# Patient Record
Sex: MALE | Race: WHITE | URBAN - METROPOLITAN AREA
[De-identification: names, ages, dates, MRNs, and addresses within clinical notes are randomized per-mention and may not be internally consistent; named-entity substitution may affect disease eponyms.]

---

## 2019-04-06 ENCOUNTER — APPOINTMENT (OUTPATIENT)
Dept: CT IMAGING | Facility: CLINIC | Age: 44
End: 2019-04-06
Attending: EMERGENCY MEDICINE
Payer: COMMERCIAL

## 2019-04-06 ENCOUNTER — HOSPITAL ENCOUNTER (EMERGENCY)
Facility: CLINIC | Age: 44
Discharge: HOME | End: 2019-04-06
Attending: EMERGENCY MEDICINE | Admitting: EMERGENCY MEDICINE
Payer: COMMERCIAL

## 2019-04-06 VITALS
TEMPERATURE: 97.4 F | DIASTOLIC BLOOD PRESSURE: 97 MMHG | HEIGHT: 73 IN | RESPIRATION RATE: 18 BRPM | HEART RATE: 78 BPM | OXYGEN SATURATION: 97 % | SYSTOLIC BLOOD PRESSURE: 142 MMHG

## 2019-04-06 DIAGNOSIS — N20.1 URETEROLITHIASIS: ICD-10-CM

## 2019-04-06 DIAGNOSIS — R10.9 FLANK PAIN: ICD-10-CM

## 2019-04-06 LAB
ALBUMIN SERPL-MCNC: 4.2 G/DL (ref 3.4–5)
ALBUMIN UR-MCNC: 10 MG/DL
ALP SERPL-CCNC: 84 U/L (ref 40–150)
ALT SERPL W P-5'-P-CCNC: 32 U/L (ref 0–70)
AMORPH CRY #/AREA URNS HPF: ABNORMAL /HPF
ANION GAP SERPL CALCULATED.3IONS-SCNC: 10 MMOL/L (ref 3–14)
APPEARANCE UR: ABNORMAL
AST SERPL W P-5'-P-CCNC: 16 U/L (ref 0–45)
BASOPHILS # BLD AUTO: 0 10E9/L (ref 0–0.2)
BASOPHILS NFR BLD AUTO: 0.1 %
BILIRUB SERPL-MCNC: 0.4 MG/DL (ref 0.2–1.3)
BILIRUB UR QL STRIP: NEGATIVE
BUN SERPL-MCNC: 21 MG/DL (ref 7–30)
CALCIUM SERPL-MCNC: 9.5 MG/DL (ref 8.5–10.1)
CHLORIDE SERPL-SCNC: 107 MMOL/L (ref 94–109)
CO2 SERPL-SCNC: 24 MMOL/L (ref 20–32)
COLOR UR AUTO: YELLOW
CREAT SERPL-MCNC: 1.08 MG/DL (ref 0.66–1.25)
DIFFERENTIAL METHOD BLD: ABNORMAL
EOSINOPHIL # BLD AUTO: 0 10E9/L (ref 0–0.7)
EOSINOPHIL NFR BLD AUTO: 0.3 %
ERYTHROCYTE [DISTWIDTH] IN BLOOD BY AUTOMATED COUNT: 12.4 % (ref 10–15)
GFR SERPL CREATININE-BSD FRML MDRD: 84 ML/MIN/{1.73_M2}
GLUCOSE SERPL-MCNC: 132 MG/DL (ref 70–99)
GLUCOSE UR STRIP-MCNC: NEGATIVE MG/DL
HCT VFR BLD AUTO: 45.8 % (ref 40–53)
HGB BLD-MCNC: 16.1 G/DL (ref 13.3–17.7)
HGB UR QL STRIP: ABNORMAL
IMM GRANULOCYTES # BLD: 0 10E9/L (ref 0–0.4)
IMM GRANULOCYTES NFR BLD: 0.2 %
KETONES UR STRIP-MCNC: 10 MG/DL
LEUKOCYTE ESTERASE UR QL STRIP: NEGATIVE
LIPASE SERPL-CCNC: 103 U/L (ref 73–393)
LYMPHOCYTES # BLD AUTO: 1.3 10E9/L (ref 0.8–5.3)
LYMPHOCYTES NFR BLD AUTO: 9.1 %
MCH RBC QN AUTO: 31.4 PG (ref 26.5–33)
MCHC RBC AUTO-ENTMCNC: 35.2 G/DL (ref 31.5–36.5)
MCV RBC AUTO: 89 FL (ref 78–100)
MONOCYTES # BLD AUTO: 0.7 10E9/L (ref 0–1.3)
MONOCYTES NFR BLD AUTO: 5 %
MUCOUS THREADS #/AREA URNS LPF: PRESENT /LPF
NEUTROPHILS # BLD AUTO: 11.7 10E9/L (ref 1.6–8.3)
NEUTROPHILS NFR BLD AUTO: 85.3 %
NITRATE UR QL: NEGATIVE
NRBC # BLD AUTO: 0 10*3/UL
NRBC BLD AUTO-RTO: 0 /100
PH UR STRIP: 7 PH (ref 5–7)
PLATELET # BLD AUTO: 268 10E9/L (ref 150–450)
POTASSIUM SERPL-SCNC: 3.6 MMOL/L (ref 3.4–5.3)
PROT SERPL-MCNC: 7.5 G/DL (ref 6.8–8.8)
RBC # BLD AUTO: 5.13 10E12/L (ref 4.4–5.9)
RBC #/AREA URNS AUTO: >182 /HPF (ref 0–2)
SODIUM SERPL-SCNC: 141 MMOL/L (ref 133–144)
SOURCE: ABNORMAL
SP GR UR STRIP: 1.02 (ref 1–1.03)
UROBILINOGEN UR STRIP-MCNC: NORMAL MG/DL (ref 0–2)
WBC # BLD AUTO: 13.8 10E9/L (ref 4–11)
WBC #/AREA URNS AUTO: 4 /HPF (ref 0–5)

## 2019-04-06 PROCEDURE — 85025 COMPLETE CBC W/AUTO DIFF WBC: CPT | Performed by: EMERGENCY MEDICINE

## 2019-04-06 PROCEDURE — 80053 COMPREHEN METABOLIC PANEL: CPT | Performed by: EMERGENCY MEDICINE

## 2019-04-06 PROCEDURE — 25000125 ZZHC RX 250: Performed by: EMERGENCY MEDICINE

## 2019-04-06 PROCEDURE — 99285 EMERGENCY DEPT VISIT HI MDM: CPT | Mod: 25

## 2019-04-06 PROCEDURE — 25000128 H RX IP 250 OP 636: Performed by: EMERGENCY MEDICINE

## 2019-04-06 PROCEDURE — 74177 CT ABD & PELVIS W/CONTRAST: CPT

## 2019-04-06 PROCEDURE — 83690 ASSAY OF LIPASE: CPT | Performed by: EMERGENCY MEDICINE

## 2019-04-06 PROCEDURE — 81001 URINALYSIS AUTO W/SCOPE: CPT | Performed by: EMERGENCY MEDICINE

## 2019-04-06 RX ORDER — IOPAMIDOL 755 MG/ML
101 INJECTION, SOLUTION INTRAVASCULAR ONCE
Status: COMPLETED | OUTPATIENT
Start: 2019-04-06 | End: 2019-04-06

## 2019-04-06 RX ORDER — ONDANSETRON 4 MG/1
4 TABLET, ORALLY DISINTEGRATING ORAL EVERY 6 HOURS PRN
Qty: 10 TABLET | Refills: 0 | Status: SHIPPED | OUTPATIENT
Start: 2019-04-06 | End: 2019-04-13

## 2019-04-06 RX ORDER — HYDROCODONE BITARTRATE AND ACETAMINOPHEN 5; 325 MG/1; MG/1
1 TABLET ORAL EVERY 6 HOURS PRN
Qty: 10 TABLET | Refills: 0 | Status: SHIPPED | OUTPATIENT
Start: 2019-04-06

## 2019-04-06 RX ORDER — TAMSULOSIN HYDROCHLORIDE 0.4 MG/1
0.4 CAPSULE ORAL DAILY
Qty: 7 CAPSULE | Refills: 0 | Status: SHIPPED | OUTPATIENT
Start: 2019-04-06 | End: 2019-04-13

## 2019-04-06 RX ADMIN — SODIUM CHLORIDE 71 ML: 9 INJECTION, SOLUTION INTRAVENOUS at 14:08

## 2019-04-06 RX ADMIN — IOPAMIDOL 101 ML: 755 INJECTION, SOLUTION INTRAVENOUS at 14:07

## 2019-04-06 ASSESSMENT — ENCOUNTER SYMPTOMS
FEVER: 0
ABDOMINAL PAIN: 1
CONSTIPATION: 0
NAUSEA: 0
DIARRHEA: 0
DYSURIA: 0
DIFFICULTY URINATING: 0
CHILLS: 0
FLANK PAIN: 0
VOMITING: 0
BLOOD IN STOOL: 0
HEMATURIA: 0

## 2019-04-06 NOTE — ED PROVIDER NOTES
"  History     Chief Complaint:  Abdominal pain     HPI   Miguel Montero is a 44 year old male who presents via EMS with abdominal pain.  The patient reports having a normal bowel movement this morning followed by sudden onset of lower abdominal pain which started more on the right but then spread to the left side as well.  This gradually worsened over approximately 30 minutes to the point where it was unbearable, prompting him to call 911.  He has never experienced similar pain in the past.  He has been eating normally and denies any nausea or vomiting.  He did not see any blood in his stool and denies recent constipation.  His pain has subsided somewhat since onset but is still present.  He denies any flank pain, urinary frequency, dysuria, hesitancy, fevers, or chills.      Allergies:  No known drug allergies.     Medications:    The patient is currently on no regular medications.     Past Medical History:    History reviewed.  No significant past medical history.      Past Surgical History:    History reviewed. No pertinent past surgical history.     Family History:    History reviewed. No pertinent family history.     Social History:  Presents to the ED alone.  Tobacco Use: Current daily smoker, 0.5 PPD.   Alcohol Use: 4 shots of liquor per week  Drug use: Marijuana use.      Review of Systems   Constitutional: Negative for chills and fever.   Gastrointestinal: Positive for abdominal pain. Negative for blood in stool, constipation, diarrhea, nausea and vomiting.   Genitourinary: Negative for difficulty urinating, dysuria, flank pain and hematuria.   All other systems reviewed and are negative.      Physical Exam   First Vitals:  BP: (!) 140/100  Heart Rate: 60  Temp: 97.4  F (36.3  C)  Resp: 18  Height: 185.4 cm (6' 1\")  SpO2: 100 %     Patient Vitals for the past 24 hrs:   BP Temp Temp src Pulse Heart Rate Resp SpO2 Height   04/06/19 1430 (!) 142/97 -- -- -- -- -- -- --   04/06/19 1330 (!) 143/93 -- -- 78 -- -- " "97 % --   04/06/19 1303 (!) 140/100 97.4  F (36.3  C) Oral -- 60 18 100 % 1.854 m (6' 1\")         Physical Exam    Physical Exam   Constitutional:  Patient is oriented to person, place, and time. They appear well-developed and well-nourished. Mild distress secondary to abdominal pain   HENT:   Mouth/Throat:   Oropharynx is clear and moist.   Eyes:    Conjunctivae normal and EOM are normal. Pupils are equal, round, and reactive to light.   Neck:    Normal range of motion.   Cardiovascular: Normal rate, regular rhythm and normal heart sounds.  Exam reveals no gallop and no friction rub.  No murmur heard.  Pulmonary/Chest:  Effort normal and breath sounds normal. Patient has no wheezes. Patient has no rales.   Abdominal:   Soft. Bowel sounds are normal. Patient exhibits no mass. There is no focal tenderness to palpation, he indicates across the lower abdomen is where he had discomfort. There is no rebound and no guarding.   Musculoskeletal:  Normal range of motion. Patient exhibits no edema.   Neurological:   Patient is alert and oriented to person, place, and time. Patient has normal strength. No cranial nerve deficit or sensory deficit. GCS 15  Skin:   Skin is warm and dry. No rash noted. No erythema.   Psychiatric:   Patient has a normal mood and affect. Patient's behavior is normal. Judgment and thought content normal.         Emergency Department Course     Imaging:  Abdomen/Pelvis CT with IV contrast:  1. 2 mm obstructing calculus right ureterovesical orifice with very mild right hydronephrosis and hydroureter with periureteral soft tissue stranding.  2. No other abnormality.   Report per radiology.      Radiographic findings were communicated with the patient who voiced understanding of the findings.    Laboratory:  CBC: WBC 13.8 (H), otherwise WNL (HGB 16.1, )   CMP: Glucose 132 (H), otherwise WNL (Creatinine 1.08)   Lipase: 103    UA: Slightly cloudy yellow urine, Ketone 10, Blood large, Protein 10, " RBC/HPF >182 (H), Mucous present, Amorphous crystals few, otherwise WNL     Emergency Department Course:  The patient arrived in the emergency department via EMS.   Nursing notes and vitals reviewed.  I performed an exam of the patient as documented above.     Urine sample was obtained and sent for laboratory analysis, findings above.     A peripheral IV was established. Blood was drawn from the patient. This was sent for laboratory testing, findings above.       The patient was sent for a CT scan of the abdomen and pelvis while in the emergency department, findings above.      Findings and plan explained to the patient. Patient discharged home with instructions regarding supportive care, medications, and reasons to return. The importance of close follow-up was reviewed. The patient was prescribed Norco, Zofran, and Flomax.    Impression & Plan      Medical Decision Making:  Zackery Montero is a 44 year old male who presented with sudden onset lower abdominal pain suspicious for renal colic. CT confirms a 2 mm ureteral stone at the right ureterovesical orifice.  Renal function is normal/baseline.  CT and lab workup show no other alternative etiology that could be causing his symptoms (e.g., AAA, appendicitis, pyelonephritis). There is no fever or convincing evidence of a urinary tract infection. On recheck, his pain is controlled with interventions in the ED and he is tolerating POs. I will prescribe supportive medications and Flomax to facilitate stone passage. I have advised him to return for uncontrolled pain, vomiting, fever, or any other concerning symptoms. I also advised to strain his urine to look for a stone and submit it to hisurologist for lab analysis.  Finally, I have advised follow up with urology within 3-5 days.     Diagnosis:    ICD-10-CM    1. Flank pain R10.9    2. Ureterolithiasis N20.1      Disposition:  Discharged to home.     Discharge Medications:     Medication List      Started     HYDROcodone-acetaminophen 5-325 MG tablet  Commonly known as:  NORCO  1 tablet, Oral, EVERY 6 HOURS PRN     ondansetron 4 MG ODT tab  Commonly known as:  ZOFRAN ODT  4 mg, Oral, EVERY 6 HOURS PRN     tamsulosin 0.4 MG capsule  Commonly known as:  FLOMAX  0.4 mg, Oral, DAILY            I, Serene Vizcarra, am serving as a scribe on 4/6/2019 at 1:11 PM to personally document services performed by Dr. Olsno based on my observations and the provider's statements to me.    Serene Vizcarra  4/6/2019    EMERGENCY DEPARTMENT       Natasha Olson MD  04/06/19 1500

## 2019-04-06 NOTE — ED NOTES
Bed: ED11  Expected date: 4/6/19  Expected time:   Means of arrival:   Comments:  Brandy Dos Santos8} 39M- Abd. Pain

## 2019-04-06 NOTE — ED AVS SNAPSHOT
Emergency Department  [address] [place] [id number]  Phone:  [phone]  Fax:  [phone]                                    Zackery Montero   MRN: [id number]    Department:   Emergency Department   Date of Visit:  4/6/2019           After Visit Summary Signature Page    I have received my discharge instructions, and my questions have been answered. I have discussed any challenges I see with this plan with the nurse or doctor.    ..........................................................................................................................................  Patient/Patient Representative Signature      ..........................................................................................................................................  Patient Representative Print Name and Relationship to Patient    ..................................................               ................................................  Date                                   Time    ..........................................................................................................................................  Reviewed by Signature/Title    ...................................................              ..............................................  Date                                               Time          22EPIC Rev 08/18